# Patient Record
Sex: FEMALE | Race: WHITE | NOT HISPANIC OR LATINO | Employment: FULL TIME | URBAN - METROPOLITAN AREA
[De-identification: names, ages, dates, MRNs, and addresses within clinical notes are randomized per-mention and may not be internally consistent; named-entity substitution may affect disease eponyms.]

---

## 2017-04-21 ENCOUNTER — OFFICE VISIT (OUTPATIENT)
Dept: ENDOCRINOLOGY | Facility: MEDICAL CENTER | Age: 27
End: 2017-04-21
Payer: COMMERCIAL

## 2017-04-21 VITALS
HEIGHT: 70 IN | DIASTOLIC BLOOD PRESSURE: 72 MMHG | WEIGHT: 196 LBS | HEART RATE: 98 BPM | OXYGEN SATURATION: 98 % | BODY MASS INDEX: 28.06 KG/M2 | SYSTOLIC BLOOD PRESSURE: 118 MMHG

## 2017-04-21 DIAGNOSIS — Z78.9 TRANSGENDER: ICD-10-CM

## 2017-04-21 DIAGNOSIS — E78.2 MIXED HYPERLIPIDEMIA: ICD-10-CM

## 2017-04-21 PROCEDURE — 99214 OFFICE O/P EST MOD 30 MIN: CPT | Performed by: INTERNAL MEDICINE

## 2017-04-21 RX ORDER — SPIRONOLACTONE 100 MG/1
100 TABLET, FILM COATED ORAL 2 TIMES DAILY
Qty: 180 TAB | Refills: 3 | Status: SHIPPED | OUTPATIENT
Start: 2017-04-21 | End: 2018-04-10 | Stop reason: SDUPTHER

## 2017-04-21 RX ORDER — ESTRADIOL 2 MG/1
4 TABLET ORAL DAILY
Qty: 180 TAB | Refills: 3 | Status: SHIPPED | OUTPATIENT
Start: 2017-04-21 | End: 2018-04-10 | Stop reason: SDUPTHER

## 2017-04-21 NOTE — MR AVS SNAPSHOT
"        Aminah John   2017 1:20 PM   Office Visit   MRN: [id number]    Department:  Endocrinology [facility]   Dept Phone:  [phone]    Description:  Female : 1990   Provider:  Marlon Honeycutt M.D.           Reason for Visit     Follow-Up Transgendered      Allergies as of 2017     No Known Allergies      You were diagnosed with     Transgender   [[id number]]         Vital Signs     Blood Pressure Pulse Height Weight Body Mass Index Oxygen Saturation    118/72 mmHg 98 1.778 m (5' 10\") 88.905 kg (196 lb) 28.12 kg/m2 98%    Smoking Status                   Never Smoker            Basic Information     Date Of Birth Sex Race Ethnicity Preferred Language    1990 Female White Non- English      Your appointments     2018  1:00 PM   Established Patient with Marlon Honeycutt M.D.   [facility]    [address], [address]  [place] [address] [phone]           You will be receiving a confirmation call a few days before your appointment from our automated call confirmation system.              Problem List              ICD-10-CM Priority Class Noted - Resolved    Transgender F64.0   2015 - Present    Dyslipidemia E78.5   10/24/2015 - Present      Health Maintenance        Date Due Completion Dates    IMM HEP B VACCINE (1 of 3 - Primary Series) 1990 ---    IMM HEP A VACCINE (1 of 2 - Standard Series) 1991 ---    IMM VARICELLA (CHICKENPOX) VACCINE (1 of 2 - 2 Dose Adolescent Series) 2003 ---    IMM DTaP/Tdap/Td Vaccine (1 - Tdap) 2009 ---    PAP SMEAR 2011 ---            Current Immunizations     No immunizations on file.      Below and/or attached are the medications your provider expects you to take. Review all of your home medications and newly ordered medications with your provider and/or pharmacist. Follow medication instructions as directed by your provider and/or pharmacist. Please keep your medication " list with you and share with your provider. Update the information when medications are discontinued, doses are changed, or new medications (including over-the-counter products) are added; and carry medication information at all times in the event of emergency situations     Allergies:  No Known Allergies          Medications  Valid as of: April 21, 2017 -  1:26 PM    Generic Name Brand Name Tablet Size Instructions for use    Estradiol (Tab) ESTRACE 2 MG Take 2 Tabs by mouth every day.        Ibuprofen (Tab) MOTRIN 200 MG Take 400 mg by mouth every 6 hours as needed.        Spironolactone (Tab) ALDACTONE 100 MG Take 1 Tab by mouth 2 times a day.        .                 Medicines prescribed today were sent to:     [facility] #[id number] - [place], [place] - [address] [address]    Phone: [phone] Fax: [phone]    Open 24 Hours?: No      Medication refill instructions:       If your prescription bottle indicates you have medication refills left, it is not necessary to call your provider’s office. Please contact your pharmacy and they will refill your medication.    If your prescription bottle indicates you do not have any refills left, you may request refills at any time through one of the following ways: The online Filtr[employer] system (except Urgent Care), by calling your provider’s office, or by asking your pharmacy to contact your provider’s office with a refill request. Medication refills are processed only during regular business hours and may not be available until the next business day. Your provider may request additional information or to have a follow-up visit with you prior to refilling your medication.   *Please Note: Medication refills are assigned a new Rx number when refilled electronically. Your pharmacy may indicate that no refills were authorized even though a new prescription for the same medication is available at the pharmacy. Please request  the medicine by name with the pharmacy before contacting your provider for a refill.        Your To Do List     Future Labs/Procedures Complete By Expires    CBC WITHOUT DIFFERENTIAL  As directed 10/20/2017    COMP METABOLIC PANEL  As directed 4/21/2018    ESTRADIOL  As directed 4/21/2018    LIPID PROFILE  As directed 4/21/2019    TESTOSTERONE SERUM  As directed 4/21/2018         MyChart Access Code: Activation code not generated  Current MyChart Status: Active

## 2017-04-21 NOTE — PROGRESS NOTES
"Endocrinology Clinic Progress Note  PCP: Julián Car D.O.    CC: Male to female transgender    HPI:  Lenny Jorgensen is a 27 y.o. old patient who comes in today for routine follow up. Patient is new to me today, previously saw Dr. Hopkins. She has been on transgender hormone therapy for several years, currently on estradiol 4 mg daily and spironolactone 150 mg per day (she recently cut down the dose of spironolactone from 100 mg twice a day 200 mg in the morning and 50 mg in the evening as she was experiencing decreased libido with higher dose of spironolactone, she feels better now). She does not smoke. No history of blood clots. She has history of elevated triglycerides. She has been limiting carbohydrate intake. No significant alcohol intake.    ROS:  Constitutional: No unintentional weight loss  : Positive for loss of libido recently, which is now improved    Past Medical History:  Patient Active Problem List    Diagnosis Date Noted   • Dyslipidemia 10/24/2015   • Transgender 08/20/2015       Medications:    Current outpatient prescriptions:   •  estradiol (ESTRACE) 2 MG Tab, Take 2 Tabs by mouth every day., Disp: 180 Tab, Rfl: 3  •  spironolactone (ALDACTONE) 100 MG Tab, Take 1 Tab by mouth 2 times a day., Disp: 180 Tab, Rfl: 3  •  ibuprofen (MOTRIN) 200 MG TABS, Take 400 mg by mouth every 6 hours as needed., Disp: , Rfl:     Labs:   Results for LENNY JORGENSEN (MRN [id number]) as of 4/21/2017 13:26   Ref. Range 6/28/2016 09:37   Cholesterol,Tot Latest Ref Range: 0-200 mg/dL 248 (H)   Triglycerides Latest Ref Range:  mg/dL 271 (H)   HDL Latest Ref Range:  mg/dL 42   Non HDL Cholesterol Latest Ref Range:   206 (H)   LDL Latest Ref Range: <100 mg/dL 158 (H)   Chol-Hdl Ratio Unknown 5.90   Prolactin Latest Units: ng/mL 8.0   Estradiol-E2 Latest Units: pg/mL 113     Physical Examination:  Vital signs: /72 mmHg  Pulse 98  Ht 1.778 m (5' 10\")  Wt 88.905 kg (196 lb)  BMI 28.12 " kg/m2  SpO2 98%  General: No apparent distress, cooperative  Eyes: No scleral icterus, no discharge, normal eyelids  Neck: No abnormal masses on inspection   Resp: Normal effort, clear to auscultation bilaterally  CVS: Regular rate and rhythm, S1 S2 normal, no murmur  Extremities: No lower extremity edema  Musculoskeletal: Normal digits and nails  Skin: No rash on visible skin  Psych: Alert and oriented, normal mood and affect    Assessment and Plan:    1. Transgender, male to female  · We discussed about potential complications of hormone therapy with estrogen, including increased risk for blood clots, hypertriglyceridemia, she is aware of these potential side effects  · Continue estradiol 4 mg daily  · Continue spironolactone 150-200 mg per day  · She has plans for surgical reconstructive surgery in November this year  Repeat labs:  - COMP METABOLIC PANEL; Future  - CBC WITHOUT DIFFERENTIAL; Future  - ESTRADIOL; Future  - TESTOSTERONE SERUM; Future    2. Mixed hyperlipidemia  Currently not on any medications for elevated triglycerides/LDL  - LIPID PROFILE; Future    Return in about 1 year (around 4/21/2018).    Thank you for allowing me to participate in the care of this patient.    Marlon Honeycutt M.D.    CC:   Julián Car D.O.    This note was created using voice recognition software (Dragon). The accuracy of the dictation is limited by the abilities of the software. I have reviewed the note prior to signing, however some errors in grammar and context are still possible. If you have any questions related to this note please do not hesitate to contact our office.

## 2018-04-20 ENCOUNTER — OFFICE VISIT (OUTPATIENT)
Dept: ENDOCRINOLOGY | Facility: MEDICAL CENTER | Age: 28
End: 2018-04-20
Payer: COMMERCIAL

## 2018-04-20 VITALS
HEART RATE: 83 BPM | BODY MASS INDEX: 27.32 KG/M2 | HEIGHT: 70 IN | DIASTOLIC BLOOD PRESSURE: 60 MMHG | OXYGEN SATURATION: 97 % | SYSTOLIC BLOOD PRESSURE: 92 MMHG | WEIGHT: 190.8 LBS

## 2018-04-20 DIAGNOSIS — E78.5 DYSLIPIDEMIA: ICD-10-CM

## 2018-04-20 PROCEDURE — 99213 OFFICE O/P EST LOW 20 MIN: CPT | Performed by: INTERNAL MEDICINE

## 2018-04-20 RX ORDER — CELECOXIB 200 MG/1
CAPSULE ORAL
COMMUNITY
Start: 2018-04-11 | End: 2019-07-15

## 2018-04-20 NOTE — PROGRESS NOTES
"Endocrinology Clinic Progress Note    CC: Hormone therapy    HPI:  Aminah John is a 28 y.o. old patient who comes in today for routine follow up. She is currently on estradiol 4 mg daily and spironolactone 150 mg daily. Reports compliance with medications. Overall feels well. Recent labs again showed very high cholesterol levels, we discussed about pros and cons of statins. She does not want to start statins yet, I advised her to at least tried at least rice.    ROS:  Constitutional: No unintentional weight loss    EXAM:  Vital signs: BP (!) 92/60   Pulse 83   Ht 1.778 m (5' 10\")   Wt 86.5 kg (190 lb 12.8 oz)   SpO2 97%   BMI 27.38 kg/m²   General: No apparent distress, cooperative  Eyes: No scleral icterus, no discharge  Resp: Normal effort  Extremities: No lower extremity edema  Psych: Alert and oriented, normal mood and affect    Assessment and Plan:    1. 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 Dyslipidemia    Return in about 1 year (around 4/20/2019).    Thank you for allowing me to participate in the care of this patient.    Marlon Honeycutt M.D.    CC:   Edna Sierra M.D.    This note was created using voice recognition software (Dragon). The accuracy of the dictation is limited by the abilities of the software. I have reviewed the note prior to signing, however some errors in grammar and context are still possible. If you have any questions related to this note please do not hesitate to contact our office.       "

## 2018-06-07 PROBLEM — G89.29 CHRONIC PAIN OF BOTH KNEES: Status: ACTIVE | Noted: 2018-06-07

## 2018-06-07 PROBLEM — M25.562 CHRONIC PAIN OF BOTH KNEES: Status: ACTIVE | Noted: 2018-06-07

## 2018-06-07 PROBLEM — M25.561 CHRONIC PAIN OF BOTH KNEES: Status: ACTIVE | Noted: 2018-06-07

## 2018-10-23 RX ORDER — SPIRONOLACTONE 100 MG/1
100 TABLET, FILM COATED ORAL 2 TIMES DAILY
Qty: 180 TAB | Refills: 0 | Status: SHIPPED | OUTPATIENT
Start: 2018-10-23 | End: 2019-01-10 | Stop reason: SDUPTHER

## 2018-10-23 RX ORDER — ESTRADIOL 2 MG/1
4 TABLET ORAL DAILY
Qty: 180 TAB | Refills: 0 | Status: SHIPPED | OUTPATIENT
Start: 2018-10-23 | End: 2019-01-10 | Stop reason: SDUPTHER

## 2019-01-11 NOTE — TELEPHONE ENCOUNTER
Was the patient seen in the last year in this department? Yes-04/20/2018    Does patient have an active prescription for medications requested? No     Received Request Via: Pharmacy     spironolactone (ALDACTONE) 100 MG Tab  TAKE 1 TABLET BY MOUTH TWICE A DAY    estradiol (ESTRACE) 2 MG Tab  TAKE 2 TABLETS BY MOUTH EVERY DAY

## 2019-01-14 RX ORDER — ESTRADIOL 2 MG/1
TABLET ORAL
Qty: 90 TAB | Refills: 3 | Status: SHIPPED | OUTPATIENT
Start: 2019-01-14 | End: 2019-04-16 | Stop reason: SDUPTHER

## 2019-01-14 RX ORDER — SPIRONOLACTONE 100 MG/1
TABLET, FILM COATED ORAL
Qty: 180 TAB | Refills: 3 | Status: SHIPPED | OUTPATIENT
Start: 2019-01-14 | End: 2019-04-16 | Stop reason: SDUPTHER

## 2019-04-16 RX ORDER — ESTRADIOL 2 MG/1
TABLET ORAL
Qty: 180 TAB | Refills: 1 | Status: SHIPPED | OUTPATIENT
Start: 2019-04-16 | End: 2019-12-18 | Stop reason: SDUPTHER

## 2019-04-16 RX ORDER — SPIRONOLACTONE 100 MG/1
TABLET, FILM COATED ORAL
Qty: 180 TAB | Refills: 3 | Status: SHIPPED | OUTPATIENT
Start: 2019-04-16 | End: 2019-07-15 | Stop reason: SDUPTHER

## 2019-04-16 NOTE — TELEPHONE ENCOUNTER
Was the patient seen in the last year in this department? Yes 4/20/18 asia has apt on 7/15 Ruben    Does patient have an active prescription for medications requested? No     Received Request Via: Pharmacy     Spirolactone and Estradiol

## 2019-07-15 ENCOUNTER — OFFICE VISIT (OUTPATIENT)
Dept: ENDOCRINOLOGY | Facility: MEDICAL CENTER | Age: 29
End: 2019-07-15
Payer: COMMERCIAL

## 2019-07-15 VITALS
DIASTOLIC BLOOD PRESSURE: 70 MMHG | OXYGEN SATURATION: 97 % | HEIGHT: 66 IN | WEIGHT: 190.4 LBS | HEART RATE: 117 BPM | BODY MASS INDEX: 30.6 KG/M2 | SYSTOLIC BLOOD PRESSURE: 106 MMHG

## 2019-07-15 DIAGNOSIS — Z79.899 HIGH RISK MEDICATION USE: ICD-10-CM

## 2019-07-15 DIAGNOSIS — E53.8 VITAMIN B 12 DEFICIENCY: ICD-10-CM

## 2019-07-15 DIAGNOSIS — E55.9 VITAMIN D DEFICIENCY: ICD-10-CM

## 2019-07-15 DIAGNOSIS — E78.5 DYSLIPIDEMIA: ICD-10-CM

## 2019-07-15 DIAGNOSIS — Z78.9 TRANSGENDER: ICD-10-CM

## 2019-07-15 PROCEDURE — 99214 OFFICE O/P EST MOD 30 MIN: CPT | Performed by: INTERNAL MEDICINE

## 2019-07-15 RX ORDER — SPIRONOLACTONE 100 MG/1
TABLET, FILM COATED ORAL
Qty: 180 TAB | Refills: 3 | Status: SHIPPED | OUTPATIENT
Start: 2019-07-15 | End: 2023-04-19

## 2019-07-15 NOTE — PROGRESS NOTES
Endocrinology Clinic Progress Note  PCP: Edna Sierra M.D.    HPI:  Aminah John is a 29 y.o. old patient who comes in today for review of transgender, male to female.   Currently on Estradiol 4 mg per day and Spironolactone 150 mg bid.     ROS:  Constitutional: No weight loss  Cardiac: No palpitations or racing heart  Resp: No shortness of breath  Neuro: No numbness or tinging in feet  Endo: No heat or cold intolerance, no polyuria or polydipsia  All other systems were reviewed and were negative.    Past Medical History:  Patient Active Problem List    Diagnosis Date Noted   • Chronic pain of both knees 06/07/2018   • Dyslipidemia 10/24/2015   • Transgender 08/20/2015       Past Surgical History:  Past Surgical History:   Procedure Laterality Date   • HERNIA REPAIR         Allergies:  Patient has no known allergies.    Social History:  Social History     Social History   • Marital status:      Spouse name: N/A   • Number of children: N/A   • Years of education: N/A     Occupational History   • Not on file.     Social History Main Topics   • Smoking status: Never Smoker   • Smokeless tobacco: Never Used   • Alcohol use No   • Drug use: No   • Sexual activity: Not on file     Other Topics Concern   • Not on file     Social History Narrative   • No narrative on file       Family History:  Family History   Problem Relation Age of Onset   • Heart Disease Father    • Diabetes Paternal Uncle    • Cancer Maternal Grandfather        Medications:    Current Outpatient Prescriptions:   •  spironolactone (ALDACTONE) 100 MG Tab, TAKE 1 TABLET BY MOUTH TWICE A DAY, Disp: 180 Tab, Rfl: 3  •  estradiol (ESTRACE) 2 MG Tab, TAKE 2 TABLETS BY MOUTH EVERY DAY, Disp: 180 Tab, Rfl: 1  •  Fish Oil-Krill Oil (KRILL OIL PLUS PO), Take 750 mg by mouth., Disp: , Rfl:   •  ibuprofen (MOTRIN) 200 MG TABS, Take 400 mg by mouth every 6 hours as needed., Disp: , Rfl:     Labs: Reviewed    Physical Examination:  Vital signs: BP  "106/70   Pulse (!) 117   Ht 1.676 m (5' 6\")   Wt 86.4 kg (190 lb 6.4 oz)   SpO2 97%   BMI 30.73 kg/m²  Body mass index is 30.73 kg/m².  General: No apparent distress, cooperative  Eyes: No scleral icterus or discharge  ENMT: Normal on external inspection of nose, lips, normal thyroid exam  Neck: No abnormal masses on inspection  Resp: Normal effort, clear to auscultation bilaterally   CVS: Regular rate and rhythm, S1 S2 normal, no murmur   Extremities: No edema  Abdomen: mild abdominal obesity present  Neuro: Alert and oriented  Skin: No rash  Psych: Normal mood and affect, intact memory and able to make informed decisions    Assessment and Plan:    1. Transgender  Continue estradiol 4 mg along with Spironolactone.  Can increase Spironolactone 200 mg twice daily.    She will be getting genital surgery done at [facility] by Dr. Telma Joe was the most experience of doing this type of surgeries and she is a transgender herself as per her introduction in public domain.    2. High risk medication use:  Recommend monitoring CMP while on estradiol and Spironolactone.    Return in about 3 months (around 10/15/2019).    Thank you for allowing me to participate in the care of this patient.    Mehdi Miranda  07/15/19    CC:   Edna Sierra M.D.    This note was created using voice recognition software (Dragon). The accuracy of the dictation is limited by the abilities of the software. I have reviewed the note prior to signing, however some errors in grammar and context are still possible. If you have any questions related to this note please do not hesitate to contact our office.   This note was scribed by Marlen Lugo RN, CDE  "

## 2019-10-08 ENCOUNTER — TELEPHONE (OUTPATIENT)
Dept: ENDOCRINOLOGY | Facility: MEDICAL CENTER | Age: 29
End: 2019-10-08

## 2019-10-08 NOTE — TELEPHONE ENCOUNTER
.1. Caller Name: Aminah                                         Call Back Number: [phone] (home)         Patient approves a detailed voicemail message: no    Patient called saying that she had missed her appointment and was wondering what her results of her labs were from August. If she needed to make any adjustments?

## 2019-10-14 PROBLEM — E55.9 VITAMIN D DEFICIENCY: Status: ACTIVE | Noted: 2019-10-14

## 2019-10-14 NOTE — PROGRESS NOTES
Endocrinology Clinic Progress Note  PCP: Edna Sierra M.D.    HPI:  Aminah John is a 29 y.o. old transgender patient who comes in today for follow up.   She is currently on Estradiol 4 mg per day and Spironolactone 150 mg per day.      Review of recent labs shows that she is deficient in Vitamin D and Vitamin B 12       Ref. Range 8/3/2019 10:11   25-Hydroxy   Vitamin D 25 Latest Ref Range: 30 - 100 ng/mL 16 (L)   Vitamin B12 -True Cobalamin Latest Ref Range: 211 - 911 pg/mL 330         ROS:  Constitutional: fatigue, No weight loss  Cardiac: No palpitations or racing heart  Resp: No shortness of breath  Muscular: Generalized muscle weakness along with leg cramps  Neuro: No numbness or tinging in feet  Endo: No heat or cold intolerance, no polyuria or polydipsia  All other systems were reviewed and were negative.    Past Medical History:  Patient Active Problem List    Diagnosis Date Noted   • Vitamin D deficiency 10/14/2019   • Chronic pain of both knees 06/07/2018   • Dyslipidemia 10/24/2015   • Transgender 08/20/2015       Past Surgical History:  Past Surgical History:   Procedure Laterality Date   • HERNIA REPAIR         Allergies:  Patient has no known allergies.    Social History:  Social History     Socioeconomic History   • Marital status:      Spouse name: Not on file   • Number of children: Not on file   • Years of education: Not on file   • Highest education level: Not on file   Occupational History   • Not on file   Social Needs   • Financial resource strain: Not on file   • Food insecurity:     Worry: Not on file     Inability: Not on file   • Transportation needs:     Medical: Not on file     Non-medical: Not on file   Tobacco Use   • Smoking status: Never Smoker   • Smokeless tobacco: Never Used   Substance and Sexual Activity   • Alcohol use: No   • Drug use: No   • Sexual activity: Not on file   Lifestyle   • Physical activity:     Days per week: Not on file     Minutes per  "session: Not on file   • Stress: Not on file   Relationships   • Social connections:     Talks on phone: Not on file     Gets together: Not on file     Attends Congregation service: Not on file     Active member of club or organization: Not on file     Attends meetings of clubs or organizations: Not on file     Relationship status: Not on file   • Intimate partner violence:     Fear of current or ex partner: Not on file     Emotionally abused: Not on file     Physically abused: Not on file     Forced sexual activity: Not on file   Other Topics Concern   • Not on file   Social History Narrative   • Not on file       Family History:  Family History   Problem Relation Age of Onset   • Heart Disease Father    • Diabetes Paternal Uncle    • Cancer Maternal Grandfather        Medications:    Current Outpatient Medications:   •  Misc Natural Products (GLUCOSAMINE CHOND COMPLEX/MSM) Tab, Take  by mouth., Disp: , Rfl:   •  vitamin D, Ergocalciferol, (DRISDOL) 82091 units Cap capsule, Take 1 Cap by mouth every 7 days., Disp: 12 Cap, Rfl: 3  •  spironolactone (ALDACTONE) 100 MG Tab, TAKE 1 TABLET BY MOUTH TWICE A DAY (Patient taking differently: Take 150 mg by mouth every day. TAKE 1 TABLET BY MOUTH TWICE A DAY), Disp: 180 Tab, Rfl: 3  •  estradiol (ESTRACE) 2 MG Tab, TAKE 2 TABLETS BY MOUTH EVERY DAY, Disp: 180 Tab, Rfl: 1  •  Fish Oil-Krill Oil (KRILL OIL PLUS PO), Take 750 mg by mouth., Disp: , Rfl:   •  ibuprofen (MOTRIN) 200 MG TABS, Take 400 mg by mouth every 6 hours as needed., Disp: , Rfl:     Labs: Reviewed    Physical Examination:  Vital signs: /66 (BP Location: Left arm, Patient Position: Sitting, BP Cuff Size: Adult)   Pulse 86   Ht 1.778 m (5' 10\")   Wt 85.3 kg (188 lb)   SpO2 96%   BMI 26.98 kg/m²  Body mass index is 26.98 kg/m².  General: No apparent distress, cooperative  Eyes: No scleral icterus or discharge  ENMT: Normal on external inspection of nose, lips, normal thyroid exam  Neck: No abnormal " masses on inspection  Resp: Normal effort, clear to auscultation bilaterally   CVS: Regular rate and rhythm, S1 S2 normal, no murmur   Extremities: No edema  Abdomen: abdominal obesity present  Neuro: Alert and oriented  Skin: No rash  Psych: Normal mood and affect, intact memory and able to make informed decisions    Assessment and Plan:  1. Transgender  Continue estradiol and Spironolactone    2. Vitamin D deficiency  50,000 units of vitamin D once a week with food.  Side effects benefits discussed with patient in detail    3. Vitamin B 12 deficiency  Recommend sublingual B12 2500 mcg daily    Return in about 4 months (around 2/16/2020).    Thank you for allowing me to participate in the care of this patient.    Mehdi Miranda M.D.  10/14/19    CC:   Edna Sierra M.D.    This note was created using voice recognition software (Dragon). The accuracy of the dictation is limited by the abilities of the software. I have reviewed the note prior to signing, however some errors in grammar and context are still possible. If you have any questions related to this note please do not hesitate to contact our office.   This note was scribed by Marlen Lugo RN, CDE

## 2019-10-16 ENCOUNTER — OFFICE VISIT (OUTPATIENT)
Dept: ENDOCRINOLOGY | Facility: MEDICAL CENTER | Age: 29
End: 2019-10-16
Payer: COMMERCIAL

## 2019-10-16 VITALS
BODY MASS INDEX: 26.92 KG/M2 | SYSTOLIC BLOOD PRESSURE: 112 MMHG | OXYGEN SATURATION: 96 % | HEART RATE: 86 BPM | DIASTOLIC BLOOD PRESSURE: 66 MMHG | HEIGHT: 70 IN | WEIGHT: 188 LBS

## 2019-10-16 DIAGNOSIS — E55.9 VITAMIN D DEFICIENCY: ICD-10-CM

## 2019-10-16 DIAGNOSIS — Z78.9 TRANSGENDER: ICD-10-CM

## 2019-10-16 DIAGNOSIS — E53.8 VITAMIN B 12 DEFICIENCY: ICD-10-CM

## 2019-10-16 PROCEDURE — 99214 OFFICE O/P EST MOD 30 MIN: CPT | Performed by: INTERNAL MEDICINE

## 2019-10-16 RX ORDER — ERGOCALCIFEROL 1.25 MG/1
50000 CAPSULE ORAL
Qty: 12 CAP | Refills: 3 | Status: SHIPPED | OUTPATIENT
Start: 2019-10-16 | End: 2020-05-13

## 2019-10-16 RX ORDER — GLUCOSAM/CHONDRO/HERB 149/HYAL 750-100 MG
TABLET ORAL
COMMUNITY

## 2019-12-16 NOTE — TELEPHONE ENCOUNTER
Was the patient seen in the last year in this department? Yes    Does patient have an active prescription for medications requested? Yes    Received Request Via: Patient     estradiol (ESTRACE) 2 MG Tab          Sig: TAKE 2 TABLETS BY MOUTH EVERY DAY

## 2019-12-18 RX ORDER — ESTRADIOL 2 MG/1
TABLET ORAL
Qty: 180 TAB | Refills: 1 | Status: SHIPPED | OUTPATIENT
Start: 2019-12-18 | End: 2023-04-19

## 2019-12-27 RX ORDER — ESTRADIOL 2 MG/1
TABLET ORAL
Qty: 90 TAB | Refills: 5 | Status: SHIPPED | OUTPATIENT
Start: 2019-12-27

## 2020-05-12 DIAGNOSIS — E55.9 VITAMIN D DEFICIENCY: ICD-10-CM

## 2020-05-12 NOTE — TELEPHONE ENCOUNTER
Received request via: Patient    Was the patient seen in the last year in this department? Yes    Does the patient have an active prescription (recently filled or refills available) for medication(s) requested? No     vitamin D, Ergocalciferol, (DRISDOL)

## 2020-05-13 RX ORDER — ERGOCALCIFEROL 1.25 MG/1
CAPSULE ORAL
Qty: 4 CAP | Refills: 5 | Status: SHIPPED | OUTPATIENT
Start: 2020-05-13

## 2021-08-26 PROBLEM — M22.2X2 PATELLOFEMORAL SYNDROME OF BOTH KNEES: Status: ACTIVE | Noted: 2021-08-26

## 2021-08-26 PROBLEM — M25.50 POLYARTHRALGIA: Status: ACTIVE | Noted: 2021-08-26

## 2021-08-26 PROBLEM — M22.2X1 PATELLOFEMORAL SYNDROME OF BOTH KNEES: Status: ACTIVE | Noted: 2021-08-26

## 2021-08-26 PROBLEM — M21.40 PES PLANUS: Status: ACTIVE | Noted: 2021-08-26

## 2023-06-28 PROBLEM — Z87.890: Status: ACTIVE | Noted: 2020-02-28

## 2023-06-28 PROBLEM — E53.8 B12 DEFICIENCY: Status: ACTIVE | Noted: 2020-02-28

## 2023-06-28 PROBLEM — F90.1 ADHD (ATTENTION DEFICIT HYPERACTIVITY DISORDER), PREDOMINANTLY HYPERACTIVE IMPULSIVE TYPE: Status: ACTIVE | Noted: 2020-02-28

## 2023-06-28 PROBLEM — E78.00 ELEVATED LDL CHOLESTEROL LEVEL: Status: ACTIVE | Noted: 2020-02-28

## 2023-06-28 PROBLEM — F64.0 GENDER DYSPHORIA IN ADULT: Status: ACTIVE | Noted: 2020-02-28
